# Patient Record
Sex: MALE | Race: ASIAN | URBAN - METROPOLITAN AREA
[De-identification: names, ages, dates, MRNs, and addresses within clinical notes are randomized per-mention and may not be internally consistent; named-entity substitution may affect disease eponyms.]

---

## 2018-01-29 ENCOUNTER — EMERGENCY (EMERGENCY)
Facility: HOSPITAL | Age: 33
LOS: 1 days | Discharge: HOME | End: 2018-01-29
Attending: EMERGENCY MEDICINE | Admitting: EMERGENCY MEDICINE
Payer: COMMERCIAL

## 2018-01-29 VITALS
HEART RATE: 70 BPM | SYSTOLIC BLOOD PRESSURE: 133 MMHG | OXYGEN SATURATION: 100 % | RESPIRATION RATE: 18 BRPM | TEMPERATURE: 98 F | DIASTOLIC BLOOD PRESSURE: 81 MMHG

## 2018-01-29 LAB
BUN SERPL-MCNC: 11 MG/DL — SIGNIFICANT CHANGE UP (ref 7–23)
CALCIUM SERPL-MCNC: 9.1 MG/DL — SIGNIFICANT CHANGE UP (ref 8.4–10.5)
CHLORIDE SERPL-SCNC: 98 MMOL/L — SIGNIFICANT CHANGE UP (ref 98–107)
CO2 SERPL-SCNC: 29 MMOL/L — SIGNIFICANT CHANGE UP (ref 22–31)
CREAT SERPL-MCNC: 0.75 MG/DL — SIGNIFICANT CHANGE UP (ref 0.5–1.3)
GLUCOSE SERPL-MCNC: 333 MG/DL — HIGH (ref 70–99)
HBA1C BLD-MCNC: 12.2 % — HIGH (ref 4–5.6)
HCT VFR BLD CALC: 45.8 % — SIGNIFICANT CHANGE UP (ref 39–50)
HGB BLD-MCNC: 15.2 G/DL — SIGNIFICANT CHANGE UP (ref 13–17)
MCHC RBC-ENTMCNC: 28.9 PG — SIGNIFICANT CHANGE UP (ref 27–34)
MCHC RBC-ENTMCNC: 33.2 % — SIGNIFICANT CHANGE UP (ref 32–36)
MCV RBC AUTO: 87.1 FL — SIGNIFICANT CHANGE UP (ref 80–100)
NRBC # FLD: 0 — SIGNIFICANT CHANGE UP
PLATELET # BLD AUTO: 257 K/UL — SIGNIFICANT CHANGE UP (ref 150–400)
PMV BLD: 10.1 FL — SIGNIFICANT CHANGE UP (ref 7–13)
POTASSIUM SERPL-MCNC: 4 MMOL/L — SIGNIFICANT CHANGE UP (ref 3.5–5.3)
POTASSIUM SERPL-SCNC: 4 MMOL/L — SIGNIFICANT CHANGE UP (ref 3.5–5.3)
RBC # BLD: 5.26 M/UL — SIGNIFICANT CHANGE UP (ref 4.2–5.8)
RBC # FLD: 12.6 % — SIGNIFICANT CHANGE UP (ref 10.3–14.5)
SODIUM SERPL-SCNC: 138 MMOL/L — SIGNIFICANT CHANGE UP (ref 135–145)
WBC # BLD: 10.84 K/UL — HIGH (ref 3.8–10.5)
WBC # FLD AUTO: 10.84 K/UL — HIGH (ref 3.8–10.5)

## 2018-01-29 PROCEDURE — 99220: CPT

## 2018-01-29 PROCEDURE — 73130 X-RAY EXAM OF HAND: CPT | Mod: 26,RT

## 2018-01-29 RX ORDER — DEXTROSE 50 % IN WATER 50 %
25 SYRINGE (ML) INTRAVENOUS ONCE
Qty: 0 | Refills: 0 | Status: DISCONTINUED | OUTPATIENT
Start: 2018-01-29 | End: 2018-02-02

## 2018-01-29 RX ORDER — GLUCAGON INJECTION, SOLUTION 0.5 MG/.1ML
1 INJECTION, SOLUTION SUBCUTANEOUS ONCE
Qty: 0 | Refills: 0 | Status: DISCONTINUED | OUTPATIENT
Start: 2018-01-29 | End: 2018-02-02

## 2018-01-29 RX ORDER — AMPICILLIN SODIUM AND SULBACTAM SODIUM 250; 125 MG/ML; MG/ML
3 INJECTION, POWDER, FOR SUSPENSION INTRAMUSCULAR; INTRAVENOUS EVERY 6 HOURS
Qty: 0 | Refills: 0 | Status: DISCONTINUED | OUTPATIENT
Start: 2018-01-29 | End: 2018-02-02

## 2018-01-29 RX ORDER — INSULIN LISPRO 100/ML
7 VIAL (ML) SUBCUTANEOUS
Qty: 0 | Refills: 0 | Status: DISCONTINUED | OUTPATIENT
Start: 2018-01-29 | End: 2018-02-02

## 2018-01-29 RX ORDER — SODIUM CHLORIDE 9 MG/ML
1000 INJECTION, SOLUTION INTRAVENOUS
Qty: 0 | Refills: 0 | Status: DISCONTINUED | OUTPATIENT
Start: 2018-01-29 | End: 2018-02-02

## 2018-01-29 RX ORDER — INSULIN LISPRO 100/ML
VIAL (ML) SUBCUTANEOUS AT BEDTIME
Qty: 0 | Refills: 0 | Status: DISCONTINUED | OUTPATIENT
Start: 2018-01-29 | End: 2018-02-02

## 2018-01-29 RX ORDER — INSULIN GLARGINE 100 [IU]/ML
21 INJECTION, SOLUTION SUBCUTANEOUS AT BEDTIME
Qty: 0 | Refills: 0 | Status: DISCONTINUED | OUTPATIENT
Start: 2018-01-29 | End: 2018-02-02

## 2018-01-29 RX ORDER — DEXTROSE 50 % IN WATER 50 %
1 SYRINGE (ML) INTRAVENOUS ONCE
Qty: 0 | Refills: 0 | Status: DISCONTINUED | OUTPATIENT
Start: 2018-01-29 | End: 2018-02-02

## 2018-01-29 RX ORDER — INSULIN LISPRO 100/ML
VIAL (ML) SUBCUTANEOUS
Qty: 0 | Refills: 0 | Status: DISCONTINUED | OUTPATIENT
Start: 2018-01-29 | End: 2018-02-02

## 2018-01-29 RX ORDER — SODIUM CHLORIDE 9 MG/ML
1000 INJECTION INTRAMUSCULAR; INTRAVENOUS; SUBCUTANEOUS
Qty: 0 | Refills: 0 | Status: DISCONTINUED | OUTPATIENT
Start: 2018-01-29 | End: 2018-02-02

## 2018-01-29 RX ORDER — OXYCODONE AND ACETAMINOPHEN 5; 325 MG/1; MG/1
1 TABLET ORAL ONCE
Qty: 0 | Refills: 0 | Status: DISCONTINUED | OUTPATIENT
Start: 2018-01-29 | End: 2018-01-29

## 2018-01-29 RX ORDER — DEXTROSE 50 % IN WATER 50 %
12.5 SYRINGE (ML) INTRAVENOUS ONCE
Qty: 0 | Refills: 0 | Status: DISCONTINUED | OUTPATIENT
Start: 2018-01-29 | End: 2018-02-02

## 2018-01-29 RX ADMIN — OXYCODONE AND ACETAMINOPHEN 1 TABLET(S): 5; 325 TABLET ORAL at 18:58

## 2018-01-29 RX ADMIN — OXYCODONE AND ACETAMINOPHEN 1 TABLET(S): 5; 325 TABLET ORAL at 18:54

## 2018-01-29 RX ADMIN — SODIUM CHLORIDE 100 MILLILITER(S): 9 INJECTION INTRAMUSCULAR; INTRAVENOUS; SUBCUTANEOUS at 21:00

## 2018-01-29 RX ADMIN — AMPICILLIN SODIUM AND SULBACTAM SODIUM 200 GRAM(S): 250; 125 INJECTION, POWDER, FOR SUSPENSION INTRAMUSCULAR; INTRAVENOUS at 18:54

## 2018-01-29 NOTE — ED CDU PROVIDER INITIAL DAY NOTE - MEDICAL DECISION MAKING DETAILS
32yM w/no pmhx presented to ED with dog bite to right hand, mutliple puncture wounds and swelling. Found to be new onset DM. In CDU for IV abx, fluids and endocrine consult in the AM.

## 2018-01-29 NOTE — ED CDU PROVIDER INITIAL DAY NOTE - ATTENDING CONTRIBUTION TO CARE
DR. MARTINS, ATTENDING MD-  I performed a face to face bedside interview with patient regarding history of present illness, review of symptoms and past medical history. I completed an independent physical exam.  I have discussed patient's plan of care with PA.   Documentation as above in the note.    Amrik: to be admitted cdu for cellulitis secondary for dog bite and new onset DM.  on exam, stable, comfortable.  already seen by hand who agrees with plan.

## 2018-01-29 NOTE — ED PROVIDER NOTE - PROGRESS NOTE DETAILS
BABS renner: pt doing well, new onset DM, will send to CDU for hand, iv abx, and endocrine consult in AM. pt agrees with plan. BABS Veras: pt seen by hand in RW area, agrees with plan for unasyn and CDU, will see in office on friday 2/2 or sooner if no improvement. does not need to re-see in AM. clear for d/c from hand perspective after Unasyn doses. Pt to go to CDU for abx, endo, and fluids/meds. pt accepted by CDU PA. D/c home on Augmentin.

## 2018-01-29 NOTE — ED PROVIDER NOTE - SKIN AREA #1
proximal/2 puncture wounds over dorsum proximal to 3rd and 4th MCPs with surrounding swelling and warmth, 1 puncture wound palmar surface of hypothenar eminence/palmar 2 puncture wounds over dorsum proximal to 3rd and 4th MCPs with surrounding swelling and warmth, 1 puncture wound palmar surface of hypothenar eminence.  Pain/swelling extends to right wrist, with limited range of motion to rt wrist and rt thumb/palmar/proximal

## 2018-01-29 NOTE — ED PROVIDER NOTE - NS_ ATTENDINGSCRIBEDETAILS _ED_A_ED_FT
The scribe's documentation has been prepared under my direction and personally reviewed by me in its entirety. I confirm that the note above accurately reflects all work, treatment, procedures, and medical decision making performed by me (Dr. Rowley).

## 2018-01-29 NOTE — ED CDU PROVIDER INITIAL DAY NOTE - PROGRESS NOTE DETAILS
Spoke with endocrine fellow:  - lantus 21 units at bedtimes  - humalog 7 units w/ meals  - moderate sliding scale with meals and bedtime  - would like nutrition consult

## 2018-01-29 NOTE — ED ADULT NURSE NOTE - OBJECTIVE STATEMENT
pt seen and treated by intake Rn and md, pt arrived to rw, stable and no complaints, right hand has mild swelling, no redness noted, pt able to open and close hand, c/o of mild discomfort, surgery at bedside, states hand does not need to bed drained, no abscess indicated, pt sent to cdu report given to rodney kent,

## 2018-01-29 NOTE — ED PROVIDER NOTE - OBJECTIVE STATEMENT
32 year old male with no significant PMHx, presents for a dog bite yesterday morning. He was at a friend's house and his dog bit his right hand. He went to urgent care but the doctors felt it was best to come to the ER due to the swelling. He notes his hand started swelling today. He denies, HA, chills, nausea, vomiting, abdominal pain, or any other complaint. His last tetanus shot was in the last 10 years. The dog has been vaccinated for rabies. 32 year old male with no significant PMHx, presents for a dog bite yesterday morning. He was at a friend's house and his dog bit his right hand. He went to urgent care but the doctors felt it was best to come to the ER due to the swelling. He notes his hand started swelling today. He denies fever, HA, chills, nausea, vomiting, abdominal pain, or any other complaint. His last tetanus shot was within the last 10 years. The dog is under care and observation of his friend.

## 2018-01-29 NOTE — ED CDU PROVIDER INITIAL DAY NOTE - PHYSICAL EXAMINATION
Skin: 3 puncture wounds to dorsal aspect of right hand, 2 puncture wounds to palmar aspect of right hand, diffuse swelling of right hand dorsal surface, +TTP  MSK: decreased ROM of right hand and wrist 2/2 pain, neurovascularly intact

## 2018-01-30 VITALS
HEART RATE: 72 BPM | TEMPERATURE: 99 F | SYSTOLIC BLOOD PRESSURE: 113 MMHG | DIASTOLIC BLOOD PRESSURE: 77 MMHG | OXYGEN SATURATION: 98 % | RESPIRATION RATE: 16 BRPM

## 2018-01-30 DIAGNOSIS — L03.113 CELLULITIS OF RIGHT UPPER LIMB: ICD-10-CM

## 2018-01-30 DIAGNOSIS — E11.9 TYPE 2 DIABETES MELLITUS WITHOUT COMPLICATIONS: ICD-10-CM

## 2018-01-30 DIAGNOSIS — S61.459A OPEN BITE OF UNSPECIFIED HAND, INITIAL ENCOUNTER: ICD-10-CM

## 2018-01-30 LAB
ALBUMIN SERPL ELPH-MCNC: 3.7 G/DL — SIGNIFICANT CHANGE UP (ref 3.3–5)
ALP SERPL-CCNC: 78 U/L — SIGNIFICANT CHANGE UP (ref 40–120)
ALT FLD-CCNC: 21 U/L — SIGNIFICANT CHANGE UP (ref 4–41)
AST SERPL-CCNC: 14 U/L — SIGNIFICANT CHANGE UP (ref 4–40)
BASOPHILS # BLD AUTO: 0.03 K/UL — SIGNIFICANT CHANGE UP (ref 0–0.2)
BASOPHILS NFR BLD AUTO: 0.3 % — SIGNIFICANT CHANGE UP (ref 0–2)
BILIRUB SERPL-MCNC: 0.5 MG/DL — SIGNIFICANT CHANGE UP (ref 0.2–1.2)
BUN SERPL-MCNC: 12 MG/DL — SIGNIFICANT CHANGE UP (ref 7–23)
CALCIUM SERPL-MCNC: 8.3 MG/DL — LOW (ref 8.4–10.5)
CHLORIDE SERPL-SCNC: 100 MMOL/L — SIGNIFICANT CHANGE UP (ref 98–107)
CHOLEST SERPL-MCNC: 184 MG/DL — SIGNIFICANT CHANGE UP (ref 120–199)
CO2 SERPL-SCNC: 29 MMOL/L — SIGNIFICANT CHANGE UP (ref 22–31)
CREAT SERPL-MCNC: 0.66 MG/DL — SIGNIFICANT CHANGE UP (ref 0.5–1.3)
EOSINOPHIL # BLD AUTO: 0.11 K/UL — SIGNIFICANT CHANGE UP (ref 0–0.5)
EOSINOPHIL NFR BLD AUTO: 1.2 % — SIGNIFICANT CHANGE UP (ref 0–6)
GLUCOSE SERPL-MCNC: 301 MG/DL — HIGH (ref 70–99)
HCT VFR BLD CALC: 40.7 % — SIGNIFICANT CHANGE UP (ref 39–50)
HDLC SERPL-MCNC: 42 MG/DL — SIGNIFICANT CHANGE UP (ref 35–55)
HGB BLD-MCNC: 13.9 G/DL — SIGNIFICANT CHANGE UP (ref 13–17)
IMM GRANULOCYTES # BLD AUTO: 0.03 # — SIGNIFICANT CHANGE UP
IMM GRANULOCYTES NFR BLD AUTO: 0.3 % — SIGNIFICANT CHANGE UP (ref 0–1.5)
LIPID PNL WITH DIRECT LDL SERPL: 135 MG/DL — SIGNIFICANT CHANGE UP
LYMPHOCYTES # BLD AUTO: 3.6 K/UL — HIGH (ref 1–3.3)
LYMPHOCYTES # BLD AUTO: 38.3 % — SIGNIFICANT CHANGE UP (ref 13–44)
MCHC RBC-ENTMCNC: 30.2 PG — SIGNIFICANT CHANGE UP (ref 27–34)
MCHC RBC-ENTMCNC: 34.2 % — SIGNIFICANT CHANGE UP (ref 32–36)
MCV RBC AUTO: 88.5 FL — SIGNIFICANT CHANGE UP (ref 80–100)
MONOCYTES # BLD AUTO: 0.59 K/UL — SIGNIFICANT CHANGE UP (ref 0–0.9)
MONOCYTES NFR BLD AUTO: 6.3 % — SIGNIFICANT CHANGE UP (ref 2–14)
NEUTROPHILS # BLD AUTO: 5.03 K/UL — SIGNIFICANT CHANGE UP (ref 1.8–7.4)
NEUTROPHILS NFR BLD AUTO: 53.6 % — SIGNIFICANT CHANGE UP (ref 43–77)
NRBC # FLD: 0 — SIGNIFICANT CHANGE UP
PLATELET # BLD AUTO: 202 K/UL — SIGNIFICANT CHANGE UP (ref 150–400)
PMV BLD: 10.1 FL — SIGNIFICANT CHANGE UP (ref 7–13)
POTASSIUM SERPL-MCNC: 3.8 MMOL/L — SIGNIFICANT CHANGE UP (ref 3.5–5.3)
POTASSIUM SERPL-SCNC: 3.8 MMOL/L — SIGNIFICANT CHANGE UP (ref 3.5–5.3)
PROT SERPL-MCNC: 6.6 G/DL — SIGNIFICANT CHANGE UP (ref 6–8.3)
RBC # BLD: 4.6 M/UL — SIGNIFICANT CHANGE UP (ref 4.2–5.8)
RBC # FLD: 12.8 % — SIGNIFICANT CHANGE UP (ref 10.3–14.5)
SODIUM SERPL-SCNC: 139 MMOL/L — SIGNIFICANT CHANGE UP (ref 135–145)
TRIGL SERPL-MCNC: 132 MG/DL — SIGNIFICANT CHANGE UP (ref 10–149)
TSH SERPL-MCNC: 2.67 UIU/ML — SIGNIFICANT CHANGE UP (ref 0.27–4.2)
WBC # BLD: 9.39 K/UL — SIGNIFICANT CHANGE UP (ref 3.8–10.5)
WBC # FLD AUTO: 9.39 K/UL — SIGNIFICANT CHANGE UP (ref 3.8–10.5)

## 2018-01-30 PROCEDURE — 99285 EMERGENCY DEPT VISIT HI MDM: CPT | Mod: GC

## 2018-01-30 PROCEDURE — 99217: CPT

## 2018-01-30 RX ORDER — OXYCODONE AND ACETAMINOPHEN 5; 325 MG/1; MG/1
1 TABLET ORAL ONCE
Qty: 0 | Refills: 0 | Status: DISCONTINUED | OUTPATIENT
Start: 2018-01-30 | End: 2018-01-30

## 2018-01-30 RX ORDER — ENOXAPARIN SODIUM 100 MG/ML
20 INJECTION SUBCUTANEOUS
Qty: 1 | Refills: 0 | OUTPATIENT
Start: 2018-01-30 | End: 2018-02-28

## 2018-01-30 RX ORDER — METFORMIN HYDROCHLORIDE 850 MG/1
2 TABLET ORAL
Qty: 120 | Refills: 0 | OUTPATIENT
Start: 2018-01-30 | End: 2018-02-28

## 2018-01-30 RX ADMIN — SODIUM CHLORIDE 100 MILLILITER(S): 9 INJECTION INTRAMUSCULAR; INTRAVENOUS; SUBCUTANEOUS at 11:50

## 2018-01-30 RX ADMIN — OXYCODONE AND ACETAMINOPHEN 1 TABLET(S): 5; 325 TABLET ORAL at 05:12

## 2018-01-30 RX ADMIN — OXYCODONE AND ACETAMINOPHEN 1 TABLET(S): 5; 325 TABLET ORAL at 12:03

## 2018-01-30 RX ADMIN — Medication 7 UNIT(S): at 09:37

## 2018-01-30 RX ADMIN — Medication 6: at 09:36

## 2018-01-30 RX ADMIN — OXYCODONE AND ACETAMINOPHEN 1 TABLET(S): 5; 325 TABLET ORAL at 06:00

## 2018-01-30 RX ADMIN — OXYCODONE AND ACETAMINOPHEN 1 TABLET(S): 5; 325 TABLET ORAL at 12:48

## 2018-01-30 RX ADMIN — INSULIN GLARGINE 21 UNIT(S): 100 INJECTION, SOLUTION SUBCUTANEOUS at 00:30

## 2018-01-30 RX ADMIN — SODIUM CHLORIDE 100 MILLILITER(S): 9 INJECTION INTRAMUSCULAR; INTRAVENOUS; SUBCUTANEOUS at 09:39

## 2018-01-30 RX ADMIN — AMPICILLIN SODIUM AND SULBACTAM SODIUM 200 GRAM(S): 250; 125 INJECTION, POWDER, FOR SUSPENSION INTRAMUSCULAR; INTRAVENOUS at 00:10

## 2018-01-30 RX ADMIN — Medication 7 UNIT(S): at 12:47

## 2018-01-30 RX ADMIN — AMPICILLIN SODIUM AND SULBACTAM SODIUM 200 GRAM(S): 250; 125 INJECTION, POWDER, FOR SUSPENSION INTRAMUSCULAR; INTRAVENOUS at 11:51

## 2018-01-30 RX ADMIN — AMPICILLIN SODIUM AND SULBACTAM SODIUM 200 GRAM(S): 250; 125 INJECTION, POWDER, FOR SUSPENSION INTRAMUSCULAR; INTRAVENOUS at 06:19

## 2018-01-30 RX ADMIN — Medication 2: at 12:47

## 2018-01-30 NOTE — CONSULT NOTE ADULT - ATTENDING COMMENTS
32 M new onset uncontrolled DM2. DC on Lantus 20u qhs (pen) and metformin 500mg BID x 1 week then increase to 1,000mg BID.  BD chase pen needles, glucometer and supplies. Outpatient endocrine follow up [phone].

## 2018-01-30 NOTE — ED CDU PROVIDER SUBSEQUENT DAY NOTE - PROGRESS NOTE DETAILS
Pt is resting comfortably in bed, VSS, NAD. Pt will continue to receive IV abx and is pending an endocrine consult in the morning for new onset DM

## 2018-01-30 NOTE — ED CDU PROVIDER DISPOSITION NOTE - CLINICAL COURSE
-placed in CDU for hand cellulitis, IV abx  -improved swelling, ROM, no fever  -new onset DM - endocrine consult

## 2018-01-30 NOTE — CONSULT NOTE ADULT - SUBJECTIVE AND OBJECTIVE BOX
Patient is a 32y old  Male who presents with a chief complaint of     INTERVAL HPI:    T(C): 36.7 (01-30-18 @ 05:06), Max: 37 (01-29-18 @ 20:35)  HR: 76 (01-30-18 @ 05:06) (65 - 76)  BP: 116/77 (01-30-18 @ 05:06) (116/77 - 140/93)  RR: 16 (01-30-18 @ 05:06) (16 - 18)  SpO2: 100% (01-30-18 @ 05:06) (99% - 100%)  Wt(kg): --  I&O's Summary    PAST MEDICAL & SURGICAL HISTORY:  No pertinent past medical history  No significant past surgical history    MEDICATIONS  (STANDING):  ampicillin/sulbactam  IVPB 3 Gram(s) IV Intermittent every 6 hours  dextrose 5%. 1000 milliLiter(s) (50 mL/Hr) IV Continuous <Continuous>  dextrose 50% Injectable 12.5 Gram(s) IV Push once  dextrose 50% Injectable 25 Gram(s) IV Push once  dextrose 50% Injectable 25 Gram(s) IV Push once  insulin glargine Injectable (LANTUS) 21 Unit(s) SubCutaneous at bedtime  insulin lispro (HumaLOG) corrective regimen sliding scale   SubCutaneous three times a day before meals  insulin lispro (HumaLOG) corrective regimen sliding scale   SubCutaneous at bedtime  insulin lispro Injectable (HumaLOG) 7 Unit(s) SubCutaneous three times a day before meals  sodium chloride 0.9%. 1000 milliLiter(s) (100 mL/Hr) IV Continuous <Continuous>    MEDICATIONS  (PRN):  dextrose Gel 1 Dose(s) Oral once PRN Blood Glucose LESS THAN 70 milliGRAM(s)/deciliter  glucagon  Injectable 1 milliGRAM(s) IntraMuscular once PRN Glucose LESS THAN 70 milligrams/deciliter    REVIEW OF SYSTEMS: See HPI    LABS:                        13.9   9.39  )-----------( 202      ( 30 Jan 2018 05:30 )             40.7     01-30    139  |  100  |  12  ----------------------------<  301<H>  3.8   |  29  |  0.66    Ca    8.3<L>      30 Jan 2018 05:30    TPro  6.6  /  Alb  3.7  /  TBili  0.5  /  DBili  x   /  AST  14  /  ALT  21  /  AlkPhos  78  01-30    LIVER FUNCTIONS - ( 30 Jan 2018 05:30 )  Alb: 3.7 g/dL / Pro: 6.6 g/dL / ALK PHOS: 78 u/L / ALT: 21 u/L / AST: 14 u/L / GGT: x     / T. Bili 0.5 mg/dL / D. Bili x         CAPILLARY BLOOD GLUCOSE  POCT Blood Glucose.: 276 mg/dL (30 Jan 2018 09:31)  POCT Blood Glucose.: 210 mg/dL (29 Jan 2018 23:01)    [facility]      RADIOLOGY & ADDITIONAL TESTS:    Xray -   CT -  MRI -     Imaging Personally Reviewed:  [x] YES  [ ] NO    Consultant(s) Notes Reviewed:  [x] YES  [ ] NO - hand Surgery    PHYSICAL EXAM:  GENERAL:   HEAD:    EYES:   ENMT:   NERVOUS SYSTEM:    CHEST/LUNG:   HEART:   ABDOMEN:   EXTREMITIES:    LYMPH:   SKIN:     Care Discussed with Consultants/Other Providers [ ] YES  [ ] NO....Pending Evaluation and Discussion with attending. Patient is a 32y old  Male who presents with a chief complaint of     INTERVAL HPI:  Pt is a 31 y/o M w/ no known PMHx who presented to the ER for a dog bite to the right hand that happened 2 days ago and was diagnosed with cellulitis fo the right hand but course was comolicated by new onset DM w/ HgbA1C of 12.2. Patient was placed in CDU for IV Abx observation and to follow-up with endocrinology. Patient states that he has not seen a doctor in over 10 years. The patient states that over the past 3-4 months he has noticed polyuria and polydipsia and xerostomia causing him to drink water frequently. Patient states that his father was diagnosed with diabetes in his 50s and his mother is pre-diabetic and his aunt was recently diagnosed with diabetes as well. No other family history of diabetes at a young age in the family.     Other than the dog bite patient denies recent cough, nasal congestion, abdominal pain, nausea, vomiting, recent antibiotic use, diarrhea, dysuria, headaches, neck stiffness, photophobia, and sick contacts, visual changes, numbness or tingling in his toes or hands, no new ulcers of his feet, and has been ambulating normally.    Patient stated that he would prefer to     T(C): 36.7 (01-30-18 @ 05:06), Max: 37 (01-29-18 @ 20:35)  HR: 76 (01-30-18 @ 05:06) (65 - 76)  BP: 116/77 (01-30-18 @ 05:06) (116/77 - 140/93)  RR: 16 (01-30-18 @ 05:06) (16 - 18)  SpO2: 100% (01-30-18 @ 05:06) (99% - 100%)  Wt(kg): --  I&O's Summary    PAST MEDICAL & SURGICAL HISTORY:  No pertinent past medical history  No significant past surgical history    MEDICATIONS  (STANDING):  ampicillin/sulbactam  IVPB 3 Gram(s) IV Intermittent every 6 hours  dextrose 5%. 1000 milliLiter(s) (50 mL/Hr) IV Continuous <Continuous>  dextrose 50% Injectable 12.5 Gram(s) IV Push once  dextrose 50% Injectable 25 Gram(s) IV Push once  dextrose 50% Injectable 25 Gram(s) IV Push once  insulin glargine Injectable (LANTUS) 21 Unit(s) SubCutaneous at bedtime  insulin lispro (HumaLOG) corrective regimen sliding scale   SubCutaneous three times a day before meals  insulin lispro (HumaLOG) corrective regimen sliding scale   SubCutaneous at bedtime  insulin lispro Injectable (HumaLOG) 7 Unit(s) SubCutaneous three times a day before meals  sodium chloride 0.9%. 1000 milliLiter(s) (100 mL/Hr) IV Continuous <Continuous>    MEDICATIONS  (PRN):  dextrose Gel 1 Dose(s) Oral once PRN Blood Glucose LESS THAN 70 milliGRAM(s)/deciliter  glucagon  Injectable 1 milliGRAM(s) IntraMuscular once PRN Glucose LESS THAN 70 milligrams/deciliter    REVIEW OF SYSTEMS: See HPI    LABS:                        13.9   9.39  )-----------( 202      ( 30 Jan 2018 05:30 )             40.7     01-30    139  |  100  |  12  ----------------------------<  301<H>  3.8   |  29  |  0.66    Ca    8.3<L>      30 Jan 2018 05:30    TPro  6.6  /  Alb  3.7  /  TBili  0.5  /  DBili  x   /  AST  14  /  ALT  21  /  AlkPhos  78  01-30    LIVER FUNCTIONS - ( 30 Jan 2018 05:30 )  Alb: 3.7 g/dL / Pro: 6.6 g/dL / ALK PHOS: 78 u/L / ALT: 21 u/L / AST: 14 u/L / GGT: x     / T. Bili 0.5 mg/dL / D. Bili x         CAPILLARY BLOOD GLUCOSE  POCT Blood Glucose.: 276 mg/dL (30 Jan 2018 09:31)  POCT Blood Glucose.: 210 mg/dL (29 Jan 2018 23:01)    RADIOLOGY & ADDITIONAL TESTS:  Xray - Right hand xray = Right hand soft tissue swelling, no fracture or foreign body    Imaging Personally Reviewed:  [x] YES  [ ] NO    Consultant(s) Notes Reviewed:  [x] YES  [ ] NO - Hand Surgery    PHYSICAL EXAM:  GENERAL: In NAD, patient overweight but not obese  HEAD: Atraumatic normocephalic   EYES: PERRL, EOMs intact b/l w/out deficit  ENMT: Moist mucus membranes  NERVOUS SYSTEM: Normal 5/5 motor strength in b/l U&LEs, normal sensation to light tough. Normal sensation to light touch of b/l feet without any deficits   CHEST/LUNG: CTAB -w/r/r  HEART: RRR -m/g/r  ABDOMEN: +BS, soft, NT, ND  EXTREMITIES:  +right hand edema w/ erythema on the dorsum of hand with multiple puncture wounds but no purulent drainage  LYMPH: LAD of anterior or posterior or supraclavicular nodes  SKIN: Feet without any ulcers and otherwise other than hand noted above the rest of skin exam is normal w/out any new rashes    Care Discussed with Consultants/Other Providers [ ] YES  [ ] NO....Pending Discussion with attending Patient is a 32y old  Male who presents with a chief complaint of     INTERVAL HPI:  Pt is a 31 y/o M w/ no known PMHx who presented to the ER for a dog bite to the right hand that happened 2 days ago and was diagnosed with cellulitis fo the right hand but course was comolicated by new onset DM w/ HgbA1C of 12.2. Patient was placed in CDU for IV Abx observation and to follow-up with endocrinology. Patient states that he has not seen a doctor in over 10 years. The patient states that over the past 3-4 months he has noticed polyuria and polydipsia and xerostomia causing him to drink water frequently. Patient states that his father was diagnosed with diabetes in his 50s and his mother is pre-diabetic and his aunt was recently diagnosed with diabetes as well. No other family history of diabetes at a young age in the family.     Other than the dog bite patient denies recent cough, nasal congestion, abdominal pain, nausea, vomiting, recent antibiotic use, diarrhea, dysuria, headaches, neck stiffness, photophobia, and sick contacts, visual changes, numbness or tingling in his toes or hands, no new ulcers of his feet, and has been ambulating normally. Patient Denies tobacco use, social ETOH use, and no drug use.     Patient stated that he would prefer to do basal insulin at night and oral agents for day control given his busy schedule.     T(C): 36.7 (01-30-18 @ 05:06), Max: 37 (01-29-18 @ 20:35)  HR: 76 (01-30-18 @ 05:06) (65 - 76)  BP: 116/77 (01-30-18 @ 05:06) (116/77 - 140/93)  RR: 16 (01-30-18 @ 05:06) (16 - 18)  SpO2: 100% (01-30-18 @ 05:06) (99% - 100%)  Wt(kg): --  I&O's Summary    PAST MEDICAL & SURGICAL HISTORY:  No pertinent past medical history  No significant past surgical history    MEDICATIONS  (STANDING):  ampicillin/sulbactam  IVPB 3 Gram(s) IV Intermittent every 6 hours  dextrose 5%. 1000 milliLiter(s) (50 mL/Hr) IV Continuous <Continuous>  dextrose 50% Injectable 12.5 Gram(s) IV Push once  dextrose 50% Injectable 25 Gram(s) IV Push once  dextrose 50% Injectable 25 Gram(s) IV Push once  insulin glargine Injectable (LANTUS) 21 Unit(s) SubCutaneous at bedtime  insulin lispro (HumaLOG) corrective regimen sliding scale   SubCutaneous three times a day before meals  insulin lispro (HumaLOG) corrective regimen sliding scale   SubCutaneous at bedtime  insulin lispro Injectable (HumaLOG) 7 Unit(s) SubCutaneous three times a day before meals  sodium chloride 0.9%. 1000 milliLiter(s) (100 mL/Hr) IV Continuous <Continuous>    MEDICATIONS  (PRN):  dextrose Gel 1 Dose(s) Oral once PRN Blood Glucose LESS THAN 70 milliGRAM(s)/deciliter  glucagon  Injectable 1 milliGRAM(s) IntraMuscular once PRN Glucose LESS THAN 70 milligrams/deciliter    REVIEW OF SYSTEMS: See HPI    LABS:                        13.9   9.39  )-----------( 202      ( 30 Jan 2018 05:30 )             40.7     01-30    139  |  100  |  12  ----------------------------<  301<H>  3.8   |  29  |  0.66    Ca    8.3<L>      30 Jan 2018 05:30    TPro  6.6  /  Alb  3.7  /  TBili  0.5  /  DBili  x   /  AST  14  /  ALT  21  /  AlkPhos  78  01-30    LIVER FUNCTIONS - ( 30 Jan 2018 05:30 )  Alb: 3.7 g/dL / Pro: 6.6 g/dL / ALK PHOS: 78 u/L / ALT: 21 u/L / AST: 14 u/L / GGT: x     / T. Bili 0.5 mg/dL / D. Bili x         CAPILLARY BLOOD GLUCOSE  POCT Blood Glucose.: 276 mg/dL (30 Jan 2018 09:31)  POCT Blood Glucose.: 210 mg/dL (29 Jan 2018 23:01)    RADIOLOGY & ADDITIONAL TESTS:  Xray - Right hand xray = Right hand soft tissue swelling, no fracture or foreign body    Imaging Personally Reviewed:  [x] YES  [ ] NO    Consultant(s) Notes Reviewed:  [x] YES  [ ] NO - Hand Surgery    PHYSICAL EXAM:  GENERAL: In NAD, patient overweight but not obese  HEAD: Atraumatic normocephalic   EYES: PERRL, EOMs intact b/l w/out deficit  ENMT: Moist mucus membranes  NERVOUS SYSTEM: Normal 5/5 motor strength in b/l U&LEs, normal sensation to light tough. Normal sensation to light touch of b/l feet without any deficits   CHEST/LUNG: CTAB -w/r/r  HEART: RRR -m/g/r  ABDOMEN: +BS, soft, NT, ND  EXTREMITIES:  +right hand edema w/ erythema on the dorsum of hand with multiple puncture wounds but no purulent drainage  LYMPH: LAD of anterior or posterior or supraclavicular nodes  SKIN: Feet without any ulcers and otherwise other than hand noted above the rest of skin exam is normal w/out any new rashes    Care Discussed with Consultants/Other Providers [ ] YES  [ ] NO....Pending Discussion with attending Patient is a 32y old  Male who presents with a chief complaint of     INTERVAL HPI:  Pt is a 31 y/o M w/ no known PMHx who presented to the ER for a dog bite to the right hand that happened 2 days ago and was diagnosed with cellulitis fo the right hand but course was comolicated by new onset DM w/ HgbA1C of 12.2. Patient was placed in CDU for IV Abx observation and to follow-up with endocrinology. Patient states that he has not seen a doctor in over 10 years. The patient states that over the past 3-4 months he has noticed polyuria and polydipsia and xerostomia causing him to drink water frequently. Patient states that his father was diagnosed with diabetes in his 50s and his mother is pre-diabetic and his aunt was recently diagnosed with diabetes as well. No other family history of diabetes at a young age in the family.     Other than the dog bite patient denies recent cough, nasal congestion, abdominal pain, nausea, vomiting, recent antibiotic use, diarrhea, dysuria, headaches, neck stiffness, photophobia, and sick contacts, +visual changes (more difficult to see distant objects and has not seen ophthalmologist in years), denies numbness or tingling in his toes or hands, no new ulcers of his feet, and has been ambulating normally. Patient Denies tobacco use, social ETOH use, and no drug use.     Patient stated that he would prefer to do basal insulin at night and oral agents for day control given his busy schedule.     T(C): 36.7 (01-30-18 @ 05:06), Max: 37 (01-29-18 @ 20:35)  HR: 76 (01-30-18 @ 05:06) (65 - 76)  BP: 116/77 (01-30-18 @ 05:06) (116/77 - 140/93)  RR: 16 (01-30-18 @ 05:06) (16 - 18)  SpO2: 100% (01-30-18 @ 05:06) (99% - 100%)  Wt(kg): --  I&O's Summary    PAST MEDICAL & SURGICAL HISTORY:  No pertinent past medical history  No significant past surgical history    MEDICATIONS  (STANDING):  ampicillin/sulbactam  IVPB 3 Gram(s) IV Intermittent every 6 hours  dextrose 5%. 1000 milliLiter(s) (50 mL/Hr) IV Continuous <Continuous>  dextrose 50% Injectable 12.5 Gram(s) IV Push once  dextrose 50% Injectable 25 Gram(s) IV Push once  dextrose 50% Injectable 25 Gram(s) IV Push once  insulin glargine Injectable (LANTUS) 21 Unit(s) SubCutaneous at bedtime  insulin lispro (HumaLOG) corrective regimen sliding scale   SubCutaneous three times a day before meals  insulin lispro (HumaLOG) corrective regimen sliding scale   SubCutaneous at bedtime  insulin lispro Injectable (HumaLOG) 7 Unit(s) SubCutaneous three times a day before meals  sodium chloride 0.9%. 1000 milliLiter(s) (100 mL/Hr) IV Continuous <Continuous>    MEDICATIONS  (PRN):  dextrose Gel 1 Dose(s) Oral once PRN Blood Glucose LESS THAN 70 milliGRAM(s)/deciliter  glucagon  Injectable 1 milliGRAM(s) IntraMuscular once PRN Glucose LESS THAN 70 milligrams/deciliter    REVIEW OF SYSTEMS: See HPI    LABS:                        13.9   9.39  )-----------( 202      ( 30 Jan 2018 05:30 )             40.7     01-30    139  |  100  |  12  ----------------------------<  301<H>  3.8   |  29  |  0.66    Ca    8.3<L>      30 Jan 2018 05:30    TPro  6.6  /  Alb  3.7  /  TBili  0.5  /  DBili  x   /  AST  14  /  ALT  21  /  AlkPhos  78  01-30    LIVER FUNCTIONS - ( 30 Jan 2018 05:30 )  Alb: 3.7 g/dL / Pro: 6.6 g/dL / ALK PHOS: 78 u/L / ALT: 21 u/L / AST: 14 u/L / GGT: x     / T. Bili 0.5 mg/dL / D. Bili x         CAPILLARY BLOOD GLUCOSE  POCT Blood Glucose.: 276 mg/dL (30 Jan 2018 09:31)  POCT Blood Glucose.: 210 mg/dL (29 Jan 2018 23:01)    RADIOLOGY & ADDITIONAL TESTS:  Xray - Right hand xray = Right hand soft tissue swelling, no fracture or foreign body    Imaging Personally Reviewed:  [x] YES  [ ] NO    Consultant(s) Notes Reviewed:  [x] YES  [ ] NO - Hand Surgery    PHYSICAL EXAM:  GENERAL: In NAD, patient overweight but not obese  HEAD: Atraumatic normocephalic   EYES: PERRL, EOMs intact b/l w/out deficit  ENMT: Moist mucus membranes  NERVOUS SYSTEM: Normal 5/5 motor strength in b/l U&LEs, normal sensation to light tough. Normal sensation to light touch of b/l feet without any deficits   CHEST/LUNG: CTAB -w/r/r  HEART: RRR -m/g/r  ABDOMEN: +BS, soft, NT, ND  EXTREMITIES:  +right hand edema w/ erythema on the dorsum of hand with multiple puncture wounds but no purulent drainage  LYMPH: LAD of anterior or posterior or supraclavicular nodes  SKIN: Feet without any ulcers and otherwise other than hand noted above the rest of skin exam is normal w/out any new rashes    Care Discussed with Consultants/Other Providers [ ] YES  [ ] NO....Pending Discussion with attending Patient is a 32y old  Male who presents with a chief complaint of     INTERVAL HPI:  Pt is a 33 y/o M w/ no known PMHx who presented to the ER for a dog bite to the right hand that happened 2 days ago and was diagnosed with cellulitis fo the right hand but course was comolicated by new onset DM w/ HgbA1C of 12.2. Patient was placed in CDU for IV Abx observation and to follow-up with endocrinology. Patient states that he has not seen a doctor in over 10 years. The patient states that over the past 3-4 months he has noticed polyuria and polydipsia and xerostomia causing him to drink water frequently. Patient states that his father was diagnosed with diabetes in his 50s and his mother is pre-diabetic and his aunt was recently diagnosed with diabetes as well. No other family history of diabetes at a young age in the family.     Other than the dog bite patient denies recent cough, nasal congestion, abdominal pain, nausea, vomiting, recent antibiotic use, diarrhea, dysuria, headaches, neck stiffness, photophobia, and sick contacts, +visual changes (more difficult to see distant objects and has not seen ophthalmologist in years), denies numbness or tingling in his toes or hands, no new ulcers of his feet, and has been ambulating normally. Patient  +tobacco use (10 years smoking and a pack a day for last year), No ETOH use, and no drug use.     Patient stated that he would prefer to do basal insulin at night and oral agents for day control given his busy schedule.     T(C): 36.7 (01-30-18 @ 05:06), Max: 37 (01-29-18 @ 20:35)  HR: 76 (01-30-18 @ 05:06) (65 - 76)  BP: 116/77 (01-30-18 @ 05:06) (116/77 - 140/93)  RR: 16 (01-30-18 @ 05:06) (16 - 18)  SpO2: 100% (01-30-18 @ 05:06) (99% - 100%)  Wt(kg): --  I&O's Summary    PAST MEDICAL & SURGICAL HISTORY:  No pertinent past medical history  No significant past surgical history    MEDICATIONS  (STANDING):  ampicillin/sulbactam  IVPB 3 Gram(s) IV Intermittent every 6 hours  dextrose 5%. 1000 milliLiter(s) (50 mL/Hr) IV Continuous <Continuous>  dextrose 50% Injectable 12.5 Gram(s) IV Push once  dextrose 50% Injectable 25 Gram(s) IV Push once  dextrose 50% Injectable 25 Gram(s) IV Push once  insulin glargine Injectable (LANTUS) 21 Unit(s) SubCutaneous at bedtime  insulin lispro (HumaLOG) corrective regimen sliding scale   SubCutaneous three times a day before meals  insulin lispro (HumaLOG) corrective regimen sliding scale   SubCutaneous at bedtime  insulin lispro Injectable (HumaLOG) 7 Unit(s) SubCutaneous three times a day before meals  sodium chloride 0.9%. 1000 milliLiter(s) (100 mL/Hr) IV Continuous <Continuous>    MEDICATIONS  (PRN):  dextrose Gel 1 Dose(s) Oral once PRN Blood Glucose LESS THAN 70 milliGRAM(s)/deciliter  glucagon  Injectable 1 milliGRAM(s) IntraMuscular once PRN Glucose LESS THAN 70 milligrams/deciliter    REVIEW OF SYSTEMS: See HPI    LABS:                        13.9   9.39  )-----------( 202      ( 30 Jan 2018 05:30 )             40.7     01-30    139  |  100  |  12  ----------------------------<  301<H>  3.8   |  29  |  0.66    Ca    8.3<L>      30 Jan 2018 05:30    TPro  6.6  /  Alb  3.7  /  TBili  0.5  /  DBili  x   /  AST  14  /  ALT  21  /  AlkPhos  78  01-30    LIVER FUNCTIONS - ( 30 Jan 2018 05:30 )  Alb: 3.7 g/dL / Pro: 6.6 g/dL / ALK PHOS: 78 u/L / ALT: 21 u/L / AST: 14 u/L / GGT: x     / T. Bili 0.5 mg/dL / D. Bili x         CAPILLARY BLOOD GLUCOSE  POCT Blood Glucose.: 276 mg/dL (30 Jan 2018 09:31)  POCT Blood Glucose.: 210 mg/dL (29 Jan 2018 23:01)    RADIOLOGY & ADDITIONAL TESTS:  Xray - Right hand xray = Right hand soft tissue swelling, no fracture or foreign body    Imaging Personally Reviewed:  [x] YES  [ ] NO    Consultant(s) Notes Reviewed:  [x] YES  [ ] NO - Hand Surgery    PHYSICAL EXAM:  GENERAL: In NAD, patient overweight but not obese  HEAD: Atraumatic normocephalic   EYES: PERRL, EOMs intact b/l w/out deficit  ENMT: Moist mucus membranes  NERVOUS SYSTEM: Normal 5/5 motor strength in b/l U&LEs, normal sensation to light tough. Normal sensation to light touch of b/l feet without any deficits   CHEST/LUNG: CTAB -w/r/r  HEART: RRR -m/g/r  ABDOMEN: +BS, soft, NT, ND  EXTREMITIES:  +right hand edema w/ erythema on the dorsum of hand with multiple puncture wounds but no purulent drainage  LYMPH: LAD of anterior or posterior or supraclavicular nodes  SKIN: Feet without any ulcers and otherwise other than hand noted above the rest of skin exam is normal w/out any new rashes    Care Discussed with Consultants/Other Providers [ ] YES  [ ] NO....Pending Discussion with attending Patient is a 32y old  Male who presents with a chief complaint of     INTERVAL HPI:  Pt is a 33 y/o M w/ no known PMHx who presented to the ER for a dog bite to the right hand that happened 2 days ago and was diagnosed with cellulitis fo the right hand but course was comolicated by new onset DM w/ HgbA1C of 12.2. Patient was placed in CDU for IV Abx observation and to follow-up with endocrinology. Patient states that he has not seen a doctor in over 10 years. The patient states that over the past 3-4 months he has noticed polyuria and polydipsia and xerostomia causing him to drink water frequently. Patient states that his father was diagnosed with diabetes in his 50s and his mother is pre-diabetic and his aunt was recently diagnosed with diabetes as well. No other family history of diabetes at a young age in the family.     Other than the dog bite patient denies recent cough, nasal congestion, abdominal pain, nausea, vomiting, recent antibiotic use, diarrhea, dysuria, headaches, neck stiffness, photophobia, and sick contacts, +visual changes (more difficult to see distant objects and has not seen ophthalmologist in years), denies numbness or tingling in his toes or hands, no new ulcers of his feet, and has been ambulating normally. Patient  +tobacco use (10 years smoking and a pack a day for last year), No ETOH use, and no drug use.     Patient stated that he would prefer to do basal insulin at night and oral agents for day control given his busy schedule.     Received 21U Lantus at 1230am and 7 unit Premeal with 6U ISS this AM  FS in  [facility] and 276 in am    T(C): 36.7 (01-30-18 @ 05:06), Max: 37 (01-29-18 @ 20:35)  HR: 76 (01-30-18 @ 05:06) (65 - 76)  BP: 116/77 (01-30-18 @ 05:06) (116/77 - 140/93)  RR: 16 (01-30-18 @ 05:06) (16 - 18)  SpO2: 100% (01-30-18 @ 05:06) (99% - 100%)  Wt(kg): --  I&O's Summary    PAST MEDICAL & SURGICAL HISTORY:  No pertinent past medical history  No significant past surgical history    MEDICATIONS  (STANDING):  ampicillin/sulbactam  IVPB 3 Gram(s) IV Intermittent every 6 hours  dextrose 5%. 1000 milliLiter(s) (50 mL/Hr) IV Continuous <Continuous>  dextrose 50% Injectable 12.5 Gram(s) IV Push once  dextrose 50% Injectable 25 Gram(s) IV Push once  dextrose 50% Injectable 25 Gram(s) IV Push once  insulin glargine Injectable (LANTUS) 21 Unit(s) SubCutaneous at bedtime  insulin lispro (HumaLOG) corrective regimen sliding scale   SubCutaneous three times a day before meals  insulin lispro (HumaLOG) corrective regimen sliding scale   SubCutaneous at bedtime  insulin lispro Injectable (HumaLOG) 7 Unit(s) SubCutaneous three times a day before meals  sodium chloride 0.9%. 1000 milliLiter(s) (100 mL/Hr) IV Continuous <Continuous>    MEDICATIONS  (PRN):  dextrose Gel 1 Dose(s) Oral once PRN Blood Glucose LESS THAN 70 milliGRAM(s)/deciliter  glucagon  Injectable 1 milliGRAM(s) IntraMuscular once PRN Glucose LESS THAN 70 milligrams/deciliter    REVIEW OF SYSTEMS: See HPI    LABS:                        13.9   9.39  )-----------( 202      ( 30 Jan 2018 05:30 )             40.7     01-30    139  |  100  |  12  ----------------------------<  301<H>  3.8   |  29  |  0.66    Ca    8.3<L>      30 Jan 2018 05:30    TPro  6.6  /  Alb  3.7  /  TBili  0.5  /  DBili  x   /  AST  14  /  ALT  21  /  AlkPhos  78  01-30    LIVER FUNCTIONS - ( 30 Jan 2018 05:30 )  Alb: 3.7 g/dL / Pro: 6.6 g/dL / ALK PHOS: 78 u/L / ALT: 21 u/L / AST: 14 u/L / GGT: x     / T. Bili 0.5 mg/dL / D. Bili x         CAPILLARY BLOOD GLUCOSE  POCT Blood Glucose.: 276 mg/dL (30 Jan 2018 09:31)  POCT Blood Glucose.: 210 mg/dL (29 Jan 2018 23:01)    RADIOLOGY & ADDITIONAL TESTS:  Xray - Right hand xray = Right hand soft tissue swelling, no fracture or foreign body    Imaging Personally Reviewed:  [x] YES  [ ] NO    Consultant(s) Notes Reviewed:  [x] YES  [ ] NO - Hand Surgery    PHYSICAL EXAM:  GENERAL: In NAD, patient overweight but not obese  HEAD: Atraumatic normocephalic   EYES: PERRL, EOMs intact b/l w/out deficit  ENMT: Moist mucus membranes  NERVOUS SYSTEM: Normal 5/5 motor strength in b/l U&LEs, normal sensation to light tough. Normal sensation to light touch of b/l feet without any deficits   CHEST/LUNG: CTAB -w/r/r  HEART: RRR -m/g/r  ABDOMEN: +BS, soft, NT, ND  EXTREMITIES:  +right hand edema w/ erythema on the dorsum of hand with multiple puncture wounds but no purulent drainage  LYMPH: LAD of anterior or posterior or supraclavicular nodes  SKIN: Feet without any ulcers and otherwise other than hand noted above the rest of skin exam is normal w/out any new rashes    Care Discussed with Consultants/Other Providers [ ] YES  [ ] NO....Pending Discussion with attending Patient is a 32y old  Male who presents with a chief complaint of     INTERVAL HPI:  Pt is a 33 y/o M w/ no known PMHx who presented to the ER for a dog bite to the right hand that happened 2 days ago and was diagnosed with cellulitis fo the right hand but course was comolicated by new onset DM w/ HgbA1C of 12.2. Patient was placed in CDU for IV Abx observation and to follow-up with endocrinology. Patient states that he has not seen a doctor in over 10 years. The patient states that over the past 3-4 months he has noticed polyuria and polydipsia and xerostomia causing him to drink water frequently. Patient states that his father was diagnosed with diabetes in his 50s and his mother is pre-diabetic and his aunt was recently diagnosed with diabetes as well. No other family history of diabetes at a young age in the family.     Other than the dog bite patient denies recent cough, nasal congestion, abdominal pain, nausea, vomiting, recent antibiotic use, diarrhea, dysuria, headaches, neck stiffness, photophobia, and sick contacts, +visual changes (more difficult to see distant objects and has not seen ophthalmologist in years), denies numbness or tingling in his toes or hands, no new ulcers of his feet, and has been ambulating normally. Patient  +tobacco use (10 years smoking and a pack a day for last year), No ETOH use, and no drug use.     Patient stated that he would prefer to do basal insulin at night and oral agents for day control given his busy schedule.     Received 21U Lantus at 1230am and 7 unit Premeal with 6U ISS this AM  FS in  QHS and 276 in am    T(C): 36.7 (01-30-18 @ 05:06), Max: 37 (01-29-18 @ 20:35)  HR: 76 (01-30-18 @ 05:06) (65 - 76)  BP: 116/77 (01-30-18 @ 05:06) (116/77 - 140/93)  RR: 16 (01-30-18 @ 05:06) (16 - 18)  SpO2: 100% (01-30-18 @ 05:06) (99% - 100%)  Wt(kg): --  I&O's Summary    PAST MEDICAL & SURGICAL HISTORY:  No pertinent past medical history  No significant past surgical history    MEDICATIONS  (STANDING):  ampicillin/sulbactam  IVPB 3 Gram(s) IV Intermittent every 6 hours  dextrose 5%. 1000 milliLiter(s) (50 mL/Hr) IV Continuous <Continuous>  dextrose 50% Injectable 12.5 Gram(s) IV Push once  dextrose 50% Injectable 25 Gram(s) IV Push once  dextrose 50% Injectable 25 Gram(s) IV Push once  insulin glargine Injectable (LANTUS) 21 Unit(s) SubCutaneous at bedtime  insulin lispro (HumaLOG) corrective regimen sliding scale   SubCutaneous three times a day before meals  insulin lispro (HumaLOG) corrective regimen sliding scale   SubCutaneous at bedtime  insulin lispro Injectable (HumaLOG) 7 Unit(s) SubCutaneous three times a day before meals  sodium chloride 0.9%. 1000 milliLiter(s) (100 mL/Hr) IV Continuous <Continuous>    MEDICATIONS  (PRN):  dextrose Gel 1 Dose(s) Oral once PRN Blood Glucose LESS THAN 70 milliGRAM(s)/deciliter  glucagon  Injectable 1 milliGRAM(s) IntraMuscular once PRN Glucose LESS THAN 70 milligrams/deciliter    REVIEW OF SYSTEMS: See HPI, otherwise negative.  · NEURO: no loss of consciousness, no gait abnormality, no headache, no sensory deficits, and no weakness.	  · Skin [-]: no abrasion; no bruising; no itch; no laceration	  · Skin [+]: RASH	  · SKIN: - - -	  · Musculoskeletal [-]: no back pain, no calf pain, no joint pain, no neck pain	  · Musculoskeletal [+]: right hand pain	  · MUSCULOSKELETAL: - - -	  · GASTROINTESTINAL: no abdominal pain, no bloating, no constipation, no diarrhea, no nausea and no vomiting.	  · RESPIRATORY: no chest pain, no cough, and no shortness of breath.	  · CARDIOVASCULAR: normal rate and rhythm, no chest pain and no edema.	  · CONSTITUTIONAL: no fever and no chills.	      LABS:                        13.9   9.39  )-----------( 202      ( 30 Jan 2018 05:30 )             40.7     01-30    139  |  100  |  12  ----------------------------<  301<H>  3.8   |  29  |  0.66    Ca    8.3<L>      30 Jan 2018 05:30    TPro  6.6  /  Alb  3.7  /  TBili  0.5  /  DBili  x   /  AST  14  /  ALT  21  /  AlkPhos  78  01-30    LIVER FUNCTIONS - ( 30 Jan 2018 05:30 )  Alb: 3.7 g/dL / Pro: 6.6 g/dL / ALK PHOS: 78 u/L / ALT: 21 u/L / AST: 14 u/L / GGT: x     / T. Bili 0.5 mg/dL / D. Bili x         CAPILLARY BLOOD GLUCOSE  POCT Blood Glucose.: 276 mg/dL (30 Jan 2018 09:31)  POCT Blood Glucose.: 210 mg/dL (29 Jan 2018 23:01)    RADIOLOGY & ADDITIONAL TESTS:  Xray - Right hand xray = Right hand soft tissue swelling, no fracture or foreign body    Imaging Personally Reviewed:  [x] YES  [ ] NO    Consultant(s) Notes Reviewed:  [x] YES  [ ] NO - Hand Surgery    PHYSICAL EXAM:  GENERAL: In NAD, patient overweight but not obese  HEAD: Atraumatic normocephalic   EYES: PERRL, EOMs intact b/l w/out deficit  ENMT: Moist mucus membranes  NERVOUS SYSTEM: Normal 5/5 motor strength in b/l U&LEs, normal sensation to light tough. Normal sensation to light touch of b/l feet without any deficits   CHEST/LUNG: CTAB -w/r/r  HEART: RRR -m/g/r  ABDOMEN: +BS, soft, NT, ND  EXTREMITIES:  +right hand edema w/ erythema on the dorsum of hand with multiple puncture wounds but no purulent drainage  LYMPH: LAD of anterior or posterior or supraclavicular nodes  SKIN: Feet without any ulcers and otherwise other than hand noted above the rest of skin exam is normal w/out any new rashes    Care Discussed with Consultants/Other Providers [ ] YES  [ ] NO....Pending Discussion with attending

## 2018-01-30 NOTE — ED CDU PROVIDER SUBSEQUENT DAY NOTE - ATTENDING CONTRIBUTION TO CARE
31yo M no significant pmh, dog bite to R hand 2d ago, placed in CDU for cellulitis, dec redness and improved rom. Elevated A1c 12.2 , endo consult pending.  On exam awake & alert, NAD.,  lungs CTAB no wheeze no crackle, RRR, 2+ pulses b/l, neuro A&Ox3, no focal deficits, skin warm and dry, R hand: multiple puncture wounds, minimally swelling, minimally erythema

## 2018-01-30 NOTE — CONSULT NOTE ADULT - PROBLEM SELECTOR RECOMMENDATION 9
- Would recommend Basal bolus insulin with Lantus 21U QHS and 7U pre-meal but after discussion with patient  - Diabetic education provided at bedside during evaluation and with Diabetic Educator  - Added Lipid profile and TSH to morning labs  - Will need Ophthalmology dilated eye exam and podiatry as outpatient for screening for DM retinopathy  - Should follow-up in Endocrine clinic on 3 months on current regimen to have repeat HgbA1C and urine microalbumin to Cr ratio    Please give resources to patient upon Discharge:  You should limit yourself to 180 grams of carbohydrates daily to help lower your hemoglobin A1C to a goal of <7.0%. You should limit excessive intake of foods that are high in simple sugars such as sodas, candies, and large amounts of sweet fruits. You should also limit excessive intake of foods that are high in complex carbohydrates such as pasta, rice, and potatoes, all of which are high in starch - a complex carbohydrate. In order to further aide in dietary changes to manage your diabetes you should maintain a food diary for one month and record the amount of carbohydrates in each meal or snack. This should be brought to your primary care doctors office or Endocrinologist for further discussion. Also instituting a moderate intensity exercise routine to help with weight loss may also improve your diabetes. For more information about dietary recommendations you can visit the website for the National Diabetes Initiative at: http://www.ndei.org/ADA-nutrition-guidelines-2013.aspx. - Would recommend Basal bolus insulin with Lantus 21U QHS and 7U pre-meal but after discussion with patient would recommend Lantus 21U QHS and Metformin 500mg BID with titration as outpatient.   - Diabetic education provided at bedside during evaluation and with Diabetic Educator  - Added Lipid profile and TSH to morning labs  - Will need Ophthalmology dilated eye exam and podiatry as outpatient for screening for DM retinopathy  - Should follow-up in Endocrine clinic on 3 months on current regimen to have repeat HgbA1C and urine microalbumin to Cr ratio    Please give resources to patient upon Discharge:  You should limit yourself to 180 grams of carbohydrates daily to help lower your hemoglobin A1C to a goal of <7.0%. You should limit excessive intake of foods that are high in simple sugars such as sodas, candies, and large amounts of sweet fruits. You should also limit excessive intake of foods that are high in complex carbohydrates such as pasta, rice, and potatoes, all of which are high in starch - a complex carbohydrate. In order to further aide in dietary changes to manage your diabetes you should maintain a food diary for one month and record the amount of carbohydrates in each meal or snack. This should be brought to your primary care doctors office or Endocrinologist for further discussion. Also instituting a moderate intensity exercise routine to help with weight loss may also improve your diabetes. For more information about dietary recommendations you can visit the website for the National Diabetes Initiative at: http://www.ndei.org/ADA-nutrition-guidelines-2013.aspx. - Would recommend Basal bolus insulin with Lantus 21U QHS and 7U pre-meal but after discussion with patient would recommend Lantus 21U QHS and Metformin 500mg BID with titration as outpatient.   - Diabetic education provided at bedside during evaluation and with Diabetic Educator  - Added Lipid profile and TSH to morning labs  - Will need Ophthalmology dilated eye exam and podiatry as outpatient for screening for DM retinopathy  - Should follow-up in Endocrine clinic on 3 months on current regimen to have repeat HgbA1C and urine microalbumin to Cr ratio  - Discussed smoking cessation with patient and amenable to Nicotine patches and recommend providing 21mg patch and need new PMD follow-up referral.     Please give resources to patient upon Discharge:  You should limit yourself to 180 grams of carbohydrates daily to help lower your hemoglobin A1C to a goal of <7.0%. You should limit excessive intake of foods that are high in simple sugars such as sodas, candies, and large amounts of sweet fruits. You should also limit excessive intake of foods that are high in complex carbohydrates such as pasta, rice, and potatoes, all of which are high in starch - a complex carbohydrate. In order to further aide in dietary changes to manage your diabetes you should maintain a food diary for one month and record the amount of carbohydrates in each meal or snack. This should be brought to your primary care doctors office or Endocrinologist for further discussion. Also instituting a moderate intensity exercise routine to help with weight loss may also improve your diabetes. For more information about dietary recommendations you can visit the website for the National Diabetes Initiative at: http://www.ndei.org/ADA-nutrition-guidelines-2013.aspx. - Would recommend Basal bolus insulin with Lantus 21U QHS and 7U pre-meal but after discussion with patient would recommend Lantus 21U QHS and Metformin 500mg BID for the first week and as patient tolerates increase to 1000mg BID the next week  - Diabetic education provided at bedside during evaluation and with Diabetic Educator  - Added Lipid profile and TSH to morning labs  - Will need Ophthalmology dilated eye exam and podiatry as outpatient for screening for DM retinopathy  - Should follow-up in Endocrine as soon as possible (patient given instruction and number ot call clinic)  - Discussed smoking cessation with patient and amenable to Nicotine patches and recommend providing 21mg patch and need new PMD follow-up referral.     Please give resources to patient upon Discharge:  You should limit yourself to 180 grams of carbohydrates daily to help lower your hemoglobin A1C to a goal of <7.0%. You should limit excessive intake of foods that are high in simple sugars such as sodas, candies, and large amounts of sweet fruits. You should also limit excessive intake of foods that are high in complex carbohydrates such as pasta, rice, and potatoes, all of which are high in starch - a complex carbohydrate. In order to further aide in dietary changes to manage your diabetes you should maintain a food diary for one month and record the amount of carbohydrates in each meal or snack. This should be brought to your primary care doctors office or Endocrinologist for further discussion. Also instituting a moderate intensity exercise routine to help with weight loss may also improve your diabetes. For more information about dietary recommendations you can visit the website for the National Diabetes Initiative at: http://www.ndei.org/ADA-nutrition-guidelines-2013.aspx. - Would recommend Basal bolus insulin with Lantus 21U QHS and 7U pre-meal but after discussion with patient would recommend Lantus 21U QHS and Metformin 500mg BID for the first week and as patient tolerates increase to 1000mg BID the next week  - Insulin supplies (Glucometer, Lancets, test strips, Pens, needles, alcohol swabs)  - Diabetic education provided at bedside during evaluation and with Diabetic Educator  - Added Lipid profile and TSH to morning labs  - Will need Ophthalmology dilated eye exam and podiatry as outpatient for screening for DM retinopathy  - Should follow-up in Endocrine as soon as possible (patient given instruction and number to call clinic)  - Discussed smoking cessation with patient and amenable to Nicotine patches and recommend providing 21mg patch and need new PMD follow-up referral for discussion of need for possible statin therapy.     Please give resources to patient upon Discharge:  You should limit yourself to 180 grams of carbohydrates daily to help lower your hemoglobin A1C to a goal of <7.0%. You should limit excessive intake of foods that are high in simple sugars such as sodas, candies, and large amounts of sweet fruits. You should also limit excessive intake of foods that are high in complex carbohydrates such as pasta, rice, and potatoes, all of which are high in starch - a complex carbohydrate. In order to further aide in dietary changes to manage your diabetes you should maintain a food diary for one month and record the amount of carbohydrates in each meal or snack. This should be brought to your primary care doctors office or Endocrinologist for further discussion. Also instituting a moderate intensity exercise routine to help with weight loss may also improve your diabetes. For more information about dietary recommendations you can visit the website for the National Diabetes Initiative at: http://www.ndei.org/ADA-nutrition-guidelines-2013.aspx. - Would recommend Basal bolus insulin with Lantus 21U QHS and 7U pre-meal but after discussion with patient he is only willing to start with once daily insulin injection upon dc.  Theref would recommend Lantus 20U QHS and Metformin 500mg BID for the first week and as patient tolerates increase to 1000mg BID the next week  - Insulin supplies (Glucometer, Lancets, test strips, Pens, needles, alcohol swabs)  - Diabetic education provided at bedside during evaluation and with Diabetic Educator  - Added Lipid profile and TSH to morning labs  - Will need Ophthalmology dilated eye exam and podiatry as outpatient for screening for DM retinopathy  - Should follow-up in Endocrine as soon as possible (patient given instruction and number to call clinic)  - Discussed smoking cessation with patient and amenable to Nicotine patches and recommend providing 21mg patch and need new PMD follow-up referral for discussion of need for possible statin therapy.     Please give resources to patient upon Discharge:  You should limit yourself to 180 grams of carbohydrates daily to help lower your hemoglobin A1C to a goal of <7.0%. You should limit excessive intake of foods that are high in simple sugars such as sodas, candies, and large amounts of sweet fruits. You should also limit excessive intake of foods that are high in complex carbohydrates such as pasta, rice, and potatoes, all of which are high in starch - a complex carbohydrate. In order to further aide in dietary changes to manage your diabetes you should maintain a food diary for one month and record the amount of carbohydrates in each meal or snack. This should be brought to your primary care doctors office or Endocrinologist for further discussion. Also instituting a moderate intensity exercise routine to help with weight loss may also improve your diabetes. For more information about dietary recommendations you can visit the website for the National Diabetes Initiative at: http://www.ndei.org/ADA-nutrition-guidelines-2013.aspx.

## 2018-01-30 NOTE — CONSULT NOTE ADULT - ASSESSMENT
Pt is a 33 y/o M no PMHx who presented with as Dog bite to right hand yesterday found to have new onset Diabetes (A1C 12.2).

## 2018-01-30 NOTE — ED CDU PROVIDER SUBSEQUENT DAY NOTE - HISTORY
33 yo male, no significant pmh,  being tx for cellulitis 2/2 dog bite which has improved with meds, dec redness and improvement rom .   Found to be diabetic in CDU, a1c 12.2 , endo consult pending.

## 2018-01-30 NOTE — CONSULT NOTE ADULT - PROBLEM SELECTOR RECOMMENDATION 3
- Continue tx as per CDU and hand surgery - Continue tx as per CDU and hand surgery    -Eliazar Parra PGY3 [facility] Pager#[phone]/Spectra#[phone]

## 2018-01-30 NOTE — ED CDU PROVIDER DISPOSITION NOTE - PLAN OF CARE
Follow up with your primary physician for a post hospital visit within 48 hours, taking all results from the ER to be reviewed. In addition set up a follow up to be seen by Dr Partida for your hand Thursday, call to make the appointment (pt has MD card/information).  For the cellulitis complete the augmentin 875mg 1 tab twice a day for 7 days. If any spreading of redness, increasing pains, swelling, fevers, chills, decreased range of motion, worsening, concerning or new signs or symptoms return to the ER Follow up with your primary physician for a post hospital visit within 48 hours, taking all results from the ER to be reviewed. In addition set up   a follow up to be seen by Dr Partida for your hand Thursday, call to make the appointment (pt has MD card/information).  For the cellulitis complete the Augmentin 875mg 1 tab twice a day for 7 days. If any spreading of redness, increasing pains, swelling, fevers, chills, decreased range of motion, worsening, concerning or new signs or symptoms return to the ER  For your diabetes, diet as reviewed, and take the insulin as directed,  keep a log of your numbers and set up a follow up in the clinic to be seen by the diabetes educator and the endocrinologist, call for the appointment. Follow up with your primary physician for a post hospital visit within 48 hours, taking all results from the ER to be reviewed. In addition set up a follow up to be seen by Dr Partida on Thursday, call to make the follow up appointment, (pt has name and number) and continue the Augmentin 875mg 1 tab twice a day for 7-10 days.    If any spreading of redness, increasing pains, swelling, fevers, chills, decreased range of motion, worsening, concerning or new signs or symptoms return to the ER  For your diabetes, diet as reviewed, and take the insulin as directed, checking your sugars 3 - 4 times a day and keeping a log.  Take lantus 20 units at bed time and metformin 500mg 1 tab twice a day for 1 week then increase to 1000 twice a day (2 tabs, twice a day).    Set up follow up to be seen by our diabetes educator and the endocrinologist, call for the appointments [phone].

## 2018-01-30 NOTE — ED CDU PROVIDER SUBSEQUENT DAY NOTE - MEDICAL DECISION MAKING DETAILS
Right hand cellulitis with improvement- Hand cleared to go home.  Rcvd unasyn will dc home with augmentin and fu hand dr daly 4 days.     Pending endo consult for new onset diabetes

## 2021-10-12 NOTE — ED CDU PROVIDER INITIAL DAY NOTE - OBJECTIVE STATEMENT
32yM w/no pmhx presented with dog bite to right hand that occurred yesterday morning, increased pain and swelling today. Pt states his friends dog bit him, dog is vaccinated and will be under surveillance. Pt states he noticed increased swelling and pain to his right hand today, decreased range of motion secondary to pain. Denies fever/chills, drainage from puncture wounds, n/v/d, abdominal pain, headache or any other concerns.  In ED pt started on Unasyn, hand saw pt who agrees to keep pt on IV Unasyn, can follow up with them on Friday as an outpatient. No need for them to see patient in the morning.   Pt found to be new onset DM with elevated glucose 333 and A1c 12.2, in CDU for endocrine consult in the morning, IV abs and IV fluids. Will repeat AM labs. No

## 2024-05-17 NOTE — ED PROVIDER NOTE - NS_EDPROVIDERDISPOUSERTYPE_ED_A_ED
Scribe Attestation (For Scribes USE Only)... none Attending Attestation (For Attendings USE Only).../Scribe Attestation (For Scribes USE Only)...